# Patient Record
Sex: FEMALE | Race: WHITE | NOT HISPANIC OR LATINO | Employment: OTHER | URBAN - METROPOLITAN AREA
[De-identification: names, ages, dates, MRNs, and addresses within clinical notes are randomized per-mention and may not be internally consistent; named-entity substitution may affect disease eponyms.]

---

## 2017-05-04 ENCOUNTER — OFFICE VISIT (OUTPATIENT)
Dept: NEUROSURGERY | Facility: CLINIC | Age: 82
End: 2017-05-04

## 2017-05-04 VITALS — BODY MASS INDEX: 24.54 KG/M2 | HEIGHT: 60 IN | WEIGHT: 125 LBS

## 2017-05-04 DIAGNOSIS — M43.16 SPONDYLOLISTHESIS OF LUMBAR REGION: ICD-10-CM

## 2017-05-04 DIAGNOSIS — M48.061 LUMBAR STENOSIS: Primary | ICD-10-CM

## 2017-05-04 PROCEDURE — 99213 OFFICE O/P EST LOW 20 MIN: CPT | Performed by: NEUROLOGICAL SURGERY

## 2017-05-04 RX ORDER — LOSARTAN POTASSIUM AND HYDROCHLOROTHIAZIDE 25; 100 MG/1; MG/1
TABLET ORAL
COMMUNITY
Start: 2017-05-01

## 2017-05-04 RX ORDER — ESOMEPRAZOLE MAGNESIUM 40 MG/1
40 CAPSULE, DELAYED RELEASE ORAL
COMMUNITY

## 2017-05-04 RX ORDER — MULTIVIT WITH MINERALS/LUTEIN
1000 TABLET ORAL DAILY
COMMUNITY

## 2017-05-04 RX ORDER — DILTIAZEM HYDROCHLORIDE 360 MG/1
360 CAPSULE, EXTENDED RELEASE ORAL DAILY
COMMUNITY

## 2017-05-04 RX ORDER — ROSUVASTATIN CALCIUM 20 MG/1
20 TABLET, COATED ORAL DAILY
COMMUNITY
End: 2021-07-02

## 2017-05-04 RX ORDER — ASPIRIN 81 MG/1
81 TABLET ORAL DAILY
COMMUNITY

## 2017-05-04 RX ORDER — POTASSIUM CHLORIDE 20 MEQ/1
TABLET, EXTENDED RELEASE ORAL
COMMUNITY
Start: 2017-04-10

## 2017-05-04 RX ORDER — LEVOTHYROXINE SODIUM 112 UG/1
112 TABLET ORAL DAILY
COMMUNITY

## 2017-05-04 RX ORDER — ESTRADIOL 0.05 MG/D
1 PATCH TRANSDERMAL WEEKLY
COMMUNITY

## 2017-06-12 ENCOUNTER — TRANSCRIBE ORDERS (OUTPATIENT)
Dept: ADMINISTRATIVE | Facility: HOSPITAL | Age: 82
End: 2017-06-12

## 2017-06-12 ENCOUNTER — HOSPITAL ENCOUNTER (OUTPATIENT)
Dept: MRI IMAGING | Facility: HOSPITAL | Age: 82
Discharge: HOME | End: 2017-06-12
Admitting: FAMILY MEDICINE

## 2017-06-12 DIAGNOSIS — R51.9 NONINTRACTABLE HEADACHE, UNSPECIFIED CHRONICITY PATTERN, UNSPECIFIED HEADACHE TYPE: Primary | ICD-10-CM

## 2017-06-12 DIAGNOSIS — R51.9 NONINTRACTABLE HEADACHE, UNSPECIFIED CHRONICITY PATTERN, UNSPECIFIED HEADACHE TYPE: ICD-10-CM

## 2017-06-12 PROCEDURE — 70551 MRI BRAIN STEM W/O DYE: CPT

## 2021-05-28 ENCOUNTER — OFFICE VISIT (OUTPATIENT)
Dept: NEUROSURGERY | Facility: CLINIC | Age: 86
End: 2021-05-28

## 2021-05-28 VITALS — HEIGHT: 60 IN | WEIGHT: 139.4 LBS | BODY MASS INDEX: 27.37 KG/M2 | TEMPERATURE: 96.9 F

## 2021-05-28 DIAGNOSIS — M43.16 SPONDYLOLISTHESIS OF LUMBAR REGION: ICD-10-CM

## 2021-05-28 DIAGNOSIS — M48.062 SPINAL STENOSIS OF LUMBAR REGION WITH NEUROGENIC CLAUDICATION: Primary | ICD-10-CM

## 2021-05-28 PROCEDURE — 99203 OFFICE O/P NEW LOW 30 MIN: CPT | Performed by: NEUROLOGICAL SURGERY

## 2021-05-28 RX ORDER — LOSARTAN POTASSIUM 100 MG/1
TABLET ORAL
COMMUNITY

## 2021-05-28 RX ORDER — PSEUDOEPHEDRINE HCL 30 MG
TABLET ORAL
COMMUNITY

## 2021-05-28 RX ORDER — TRIAMCINOLONE ACETONIDE 5 MG/G
CREAM TOPICAL
COMMUNITY

## 2021-05-28 RX ORDER — ESOMEPRAZOLE MAGNESIUM 40 MG/1
CAPSULE, DELAYED RELEASE ORAL
COMMUNITY

## 2021-05-28 RX ORDER — ESTRADIOL 0.5 MG/1
0.5 TABLET ORAL DAILY
COMMUNITY
Start: 2021-03-01

## 2021-05-28 RX ORDER — DOXYCYCLINE HYCLATE 100 MG/1
CAPSULE ORAL
COMMUNITY

## 2021-05-28 RX ORDER — ASPIRIN 81 MG/1
TABLET ORAL
COMMUNITY

## 2021-05-28 RX ORDER — FLUTICASONE PROPIONATE 50 MCG
1 SPRAY, SUSPENSION (ML) NASAL DAILY
COMMUNITY
Start: 2021-03-16

## 2021-05-28 RX ORDER — HYDROCHLOROTHIAZIDE 12.5 MG/1
TABLET ORAL
COMMUNITY

## 2021-05-28 RX ORDER — HYDRALAZINE HYDROCHLORIDE 10 MG/1
TABLET, FILM COATED ORAL
COMMUNITY

## 2021-05-28 RX ORDER — LEVOTHYROXINE SODIUM 0.1 MG/1
TABLET ORAL
COMMUNITY

## 2021-05-28 RX ORDER — LEVOTHYROXINE SODIUM 112 UG/1
TABLET ORAL
COMMUNITY

## 2021-05-28 RX ORDER — HYDRALAZINE HYDROCHLORIDE 10 MG/1
TABLET, FILM COATED ORAL
COMMUNITY
Start: 2021-03-16

## 2021-05-28 RX ORDER — NITROGLYCERIN 0.4 MG/1
TABLET SUBLINGUAL
COMMUNITY

## 2021-05-28 RX ORDER — LOSARTAN POTASSIUM AND HYDROCHLOROTHIAZIDE 12.5; 1 MG/1; MG/1
TABLET ORAL
COMMUNITY

## 2021-05-28 NOTE — PROGRESS NOTES
Patient: Venita San  : 1935    Primary Care Provider: Dedra Ariza MD    Requesting Provider: As above        History    Chief Complaint: Low back and right thigh pain with lower extremity sensory alteration.    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  She is known to our service.  In 2017 she saw my former colleague Dr. Brennan for some similar problems.  She was noted to have stenosis and spondylolisthesis.  Her symptoms were manageable.  Over the last 6 months or so her symptoms have increased.  She has some occasional low back pain.  Most of the pain is in her hip and will extend into the front and back of her right thigh.  In the past she has had some left leg symptoms.  If she stands or walks too long then she gets numbness in her feet and legs.  This goes away with sitting or lying down.  She has had an IM shot of Decadron and some oral steroids in recent months and that was helpful.  Ibuprofen is taken intermittently and that is helpful.  She has no bowel or bladder dysfunction.    Review of Systems   Constitutional: Negative for activity change, appetite change, chills, diaphoresis, fatigue, fever and unexpected weight change.   HENT: Positive for congestion and hearing loss. Negative for dental problem, drooling, ear discharge, ear pain, facial swelling, mouth sores, nosebleeds, postnasal drip, rhinorrhea, sinus pressure, sinus pain, sneezing, sore throat, tinnitus, trouble swallowing and voice change.    Eyes: Negative for photophobia, pain, discharge, redness, itching and visual disturbance.   Respiratory: Negative for apnea, cough, choking, chest tightness, shortness of breath, wheezing and stridor.    Cardiovascular: Negative for chest pain, palpitations and leg swelling.   Gastrointestinal: Negative for abdominal distention, abdominal pain, anal bleeding, blood in stool, constipation, diarrhea, nausea, rectal pain  "and vomiting.   Endocrine: Negative for cold intolerance, heat intolerance, polydipsia, polyphagia and polyuria.   Genitourinary: Negative for decreased urine volume, difficulty urinating, dysuria, enuresis, flank pain, frequency, genital sores, hematuria and urgency.   Musculoskeletal: Positive for back pain. Negative for arthralgias, gait problem, joint swelling, myalgias, neck pain and neck stiffness.   Skin: Negative for color change, pallor, rash and wound.   Allergic/Immunologic: Positive for environmental allergies and food allergies. Negative for immunocompromised state.   Neurological: Positive for numbness. Negative for dizziness, tremors, seizures, syncope, facial asymmetry, speech difficulty, weakness, light-headedness and headaches.   Hematological: Negative for adenopathy. Bruises/bleeds easily.   Psychiatric/Behavioral: Negative for agitation, behavioral problems, confusion, decreased concentration, dysphoric mood, hallucinations, self-injury, sleep disturbance and suicidal ideas. The patient is not nervous/anxious and is not hyperactive.        The patient's past medical history, past surgical history, family history, and social history have been reviewed at length in the electronic medical record.    Physical Exam:   Temp 96.9 °F (36.1 °C)   Ht 152.4 cm (60\")   Wt 63.2 kg (139 lb 6.4 oz)   BMI 27.22 kg/m²   CONSTITUTIONAL: Patient is well-nourished, pleasant and appears younger than her stated age  CV: Heart regular rate and rhythm without murmur, rub, or gallop.  PULMONARY: Lungs are clear to ascultation.  MUSCULOSKELETAL:  Straight leg raising is negative.  Alexis's Sign is negative.  ROM in the low back is normal.  Tenderness in the back to palpation is not observed.  NEUROLOGICAL:  Orientation, memory, attention span, language function, and cognition have been examined and are intact.  Strength is intact in the lower extremities to direct testing.  Muscle tone is normal throughout.  Station " and gait are normal.  Sensation is intact to light touch testing throughout.  Deep tendon reflexes are difficult to elicit throughout.  Coordination is intact.      Medical Decision Making    Data Review:   (All imaging studies were personally reviewed unless stated otherwise)  MRI of the lumbar spine dated 5/7/2021 demonstrates multilevel degenerative disc disease and facet arthropathy.  This results in moderate to generous stenosis at L2-3 and severe stenosis at L3-4 and L4-5.  Low-grade listhesis of L3 on L4 and L4 on L5 is noted.  Modest joint effusions are noted bilaterally at L5-S1, on the left at L4-5, and bilaterally at L3-4.    Diagnosis:   1.  Lumbar stenosis with neurogenic claudication.  2.  Lumbar spondylolisthesis.    Treatment Options:   I am going to refer the patient for some therapy.  Prior to follow-up in several weeks I am going to check some flexion-extension upright lateral spine x-rays.  She does not feel that her symptoms are severe enough to warrant surgical intervention.  If the therapy is not helpful then we might consider injections.       Diagnosis Plan   1. Spinal stenosis of lumbar region with neurogenic claudication  Ambulatory Referral to Physical Therapy Evaluate and treat; Stretching, ROM, Strengthening   2. Spondylolisthesis of lumbar region  XR Spine Lumbar Complete With Flex & Ext       Scribed for James Tobias MD by TONJA Chavez 5/28/2021 10:43 EDT      I, Dr. Tobias, personally performed the services described in the documentation, as scribed in my presence, and it is both accurate and complete.

## 2021-06-30 ENCOUNTER — HOSPITAL ENCOUNTER (OUTPATIENT)
Dept: GENERAL RADIOLOGY | Facility: HOSPITAL | Age: 86
Discharge: HOME | End: 2021-06-30
Admitting: NEUROLOGICAL SURGERY

## 2021-06-30 DIAGNOSIS — M43.16 SPONDYLOLISTHESIS OF LUMBAR REGION: ICD-10-CM

## 2021-06-30 PROCEDURE — 72120 X-RAY BEND ONLY L-S SPINE: CPT

## 2021-07-02 ENCOUNTER — OFFICE VISIT (OUTPATIENT)
Dept: NEUROSURGERY | Facility: CLINIC | Age: 86
End: 2021-07-02

## 2021-07-02 VITALS — WEIGHT: 140 LBS | BODY MASS INDEX: 27.48 KG/M2 | TEMPERATURE: 97.5 F | HEIGHT: 60 IN

## 2021-07-02 DIAGNOSIS — M43.16 SPONDYLOLISTHESIS OF LUMBAR REGION: ICD-10-CM

## 2021-07-02 DIAGNOSIS — M48.062 SPINAL STENOSIS OF LUMBAR REGION WITH NEUROGENIC CLAUDICATION: Primary | ICD-10-CM

## 2021-07-02 DIAGNOSIS — R20.0 NUMBNESS AND TINGLING OF LOWER EXTREMITY: ICD-10-CM

## 2021-07-02 DIAGNOSIS — R20.2 NUMBNESS AND TINGLING OF LOWER EXTREMITY: ICD-10-CM

## 2021-07-02 PROBLEM — R10.11 RIGHT UPPER QUADRANT PAIN: Status: ACTIVE | Noted: 2021-07-02

## 2021-07-02 PROBLEM — M54.30 ACUTE SCIATICA: Status: ACTIVE | Noted: 2021-07-02

## 2021-07-02 PROBLEM — I10 BENIGN HYPERTENSION: Status: ACTIVE | Noted: 2021-07-02

## 2021-07-02 PROBLEM — R07.9 CHEST PAIN: Status: ACTIVE | Noted: 2021-07-02

## 2021-07-02 PROBLEM — E03.9 HYPOTHYROIDISM: Status: ACTIVE | Noted: 2021-07-02

## 2021-07-02 PROBLEM — M48.9 CERVICAL SPINE DISEASE: Status: ACTIVE | Noted: 2021-07-02

## 2021-07-02 PROBLEM — K21.9 GASTROESOPHAGEAL REFLUX DISEASE: Status: ACTIVE | Noted: 2020-08-24

## 2021-07-02 PROBLEM — R53.83 FATIGUE: Status: ACTIVE | Noted: 2021-07-02

## 2021-07-02 PROBLEM — N81.6 HERNIATION OF RECTUM INTO VAGINA: Status: ACTIVE | Noted: 2021-07-02

## 2021-07-02 PROBLEM — M76.30 ILIOTIBIAL BAND SYNDROME: Status: ACTIVE | Noted: 2021-07-02

## 2021-07-02 PROBLEM — K30 INDIGESTION: Status: ACTIVE | Noted: 2021-07-02

## 2021-07-02 PROBLEM — M25.569 KNEE PAIN: Status: ACTIVE | Noted: 2021-07-02

## 2021-07-02 PROCEDURE — 99212 OFFICE O/P EST SF 10 MIN: CPT | Performed by: NEUROLOGICAL SURGERY

## 2021-07-02 RX ORDER — ROSUVASTATIN CALCIUM 40 MG/1
40 TABLET, COATED ORAL DAILY
COMMUNITY
Start: 2021-06-09

## 2021-07-02 NOTE — PROGRESS NOTES
Patient: Venita San  : 1935    Primary Care Provider: Dedra Ariza MD    Requesting Provider: As above        History    Chief Complaint: Low back and right thigh pain with lower extremity sensory alteration.    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  She saw Dr. Brennan in 2017 with similar problems.  She has a known spondylolisthesis and stenosis.  Over the last 6 months her symptoms have increased.  Intermittently she will have bothersome low back pain.  Sometimes the pain will involve her hip as well as her thigh.  She has done some therapy since I saw her last.  On some days it is helpful on other days it makes her worse.  It has helped with strengthening.  Her symptoms are unpredictable.  Some days they bother her and other days they do not.  Her biggest frustration is not pain but the fact that her legs will become numb and give out on her intermittently.  This does not occur with any sort of position or activity.    Review of Systems   Constitutional: Negative for activity change, appetite change, chills, diaphoresis, fatigue, fever and unexpected weight change.   HENT: Positive for congestion and hearing loss. Negative for dental problem, drooling, ear discharge, ear pain, facial swelling, mouth sores, nosebleeds, postnasal drip, rhinorrhea, sinus pressure, sinus pain, sneezing, sore throat, tinnitus, trouble swallowing and voice change.    Eyes: Negative for photophobia, pain, discharge, redness, itching and visual disturbance.   Respiratory: Negative for apnea, cough, choking, chest tightness, shortness of breath, wheezing and stridor.    Cardiovascular: Negative for chest pain, palpitations and leg swelling.   Gastrointestinal: Negative for abdominal distention, abdominal pain, anal bleeding, blood in stool, constipation, diarrhea, nausea, rectal pain and vomiting.   Endocrine: Negative for cold intolerance, heat  "intolerance, polydipsia, polyphagia and polyuria.   Genitourinary: Negative for decreased urine volume, difficulty urinating, dysuria, enuresis, flank pain, frequency, genital sores, hematuria and urgency.   Musculoskeletal: Positive for back pain. Negative for arthralgias, gait problem, joint swelling, myalgias, neck pain and neck stiffness.   Skin: Negative for color change, pallor, rash and wound.   Allergic/Immunologic: Positive for environmental allergies and food allergies. Negative for immunocompromised state.   Neurological: Positive for numbness. Negative for dizziness, tremors, seizures, syncope, facial asymmetry, speech difficulty, weakness, light-headedness and headaches.   Hematological: Negative for adenopathy. Bruises/bleeds easily.   Psychiatric/Behavioral: Negative for agitation, behavioral problems, confusion, decreased concentration, dysphoric mood, hallucinations, self-injury, sleep disturbance and suicidal ideas. The patient is not nervous/anxious and is not hyperactive.        The patient's past medical history, past surgical history, family history, and social history have been reviewed at length in the electronic medical record.    Physical Exam:   Temp 97.5 °F (36.4 °C)   Ht 152.4 cm (60\")   Wt 63.5 kg (140 lb)   BMI 27.34 kg/m²   MUSCULOSKELETAL:  Straight leg raising is negative.  Alexis's Sign is negative.  ROM in the low back is normal.  Tenderness in the back to palpation is not observed.  NEUROLOGICAL:  Strength is intact in the lower extremities to direct testing.  Muscle tone is normal throughout.  Station and gait are normal.  Sensation is intact to light touch testing throughout.      Medical Decision Making    Data Review:   (All imaging studies were personally reviewed unless stated otherwise)  MRI of the lumbar spine dated 5/7/2021 demonstrates multilevel degenerative disc disease and facet arthropathy.  This results in moderate to generous stenosis at L2-3 and severe " stenosis at L3-4 and L4-5.  Low-grade listhesis of L3 on L4 and L4 on L5 is noted.  Modest joint effusions are noted bilaterally at L5-S1, on the left at L4-5, and bilaterally at L3-4.    Plain flexion-extension upright lateral lumbar spine x-rays demonstrate diminished bone density.  Prominent listhesis of L3 on L4 and even more so at L4-5 is stable however.    Diagnosis:   1.  Intermittent mechanical back pain and claudication.  2.  Lumbar spondylolisthesis.    Treatment Options:   The patient symptoms really come and go.  Symptoms are not severe enough to warrant surgical intervention.  Unfortunately I do not have a simple solution to address her intermittent symptoms.  If her difficulties progress then I will be happy to see her in follow-up.       Diagnosis Plan   1. Spinal stenosis of lumbar region with neurogenic claudication     2. Spondylolisthesis of lumbar region     3. Numbness and tingling of lower extremity           Scribed for James Tobias MD by Juana Palmer CMA. 7/2/2021 10:04 EDT  I, Dr. Tobias, personally performed the services described in the documentation, as scribed in my presence, and it is both accurate and complete.

## 2023-06-06 ENCOUNTER — OFFICE VISIT (OUTPATIENT)
Dept: NEUROSURGERY | Facility: CLINIC | Age: 88
End: 2023-06-06
Payer: MEDICARE

## 2023-06-06 VITALS — HEIGHT: 60 IN | BODY MASS INDEX: 26.62 KG/M2 | WEIGHT: 135.6 LBS | TEMPERATURE: 98 F

## 2023-06-06 DIAGNOSIS — M51.36 DDD (DEGENERATIVE DISC DISEASE), LUMBAR: ICD-10-CM

## 2023-06-06 DIAGNOSIS — M43.16 SPONDYLOLISTHESIS OF LUMBAR REGION: ICD-10-CM

## 2023-06-06 DIAGNOSIS — M48.062 SPINAL STENOSIS OF LUMBAR REGION WITH NEUROGENIC CLAUDICATION: Primary | ICD-10-CM

## 2023-06-06 PROCEDURE — 1159F MED LIST DOCD IN RCRD: CPT | Performed by: NEUROLOGICAL SURGERY

## 2023-06-06 PROCEDURE — 1160F RVW MEDS BY RX/DR IN RCRD: CPT | Performed by: NEUROLOGICAL SURGERY

## 2023-06-06 PROCEDURE — 99214 OFFICE O/P EST MOD 30 MIN: CPT | Performed by: NEUROLOGICAL SURGERY

## 2023-06-06 RX ORDER — ACETAMINOPHEN/DIPHENHYDRAMINE 500MG-25MG
1 TABLET ORAL NIGHTLY
COMMUNITY

## 2023-06-06 RX ORDER — FLUTICASONE PROPIONATE 50 MCG
1 SPRAY, SUSPENSION (ML) NASAL DAILY
COMMUNITY

## 2023-06-06 RX ORDER — GABAPENTIN 300 MG/1
300 CAPSULE ORAL NIGHTLY
COMMUNITY

## 2023-06-06 RX ORDER — LORATADINE 10 MG/1
10 TABLET ORAL DAILY
COMMUNITY

## 2023-06-06 RX ORDER — ROSUVASTATIN CALCIUM 20 MG/1
20 TABLET, COATED ORAL DAILY
COMMUNITY

## 2023-06-06 NOTE — PROGRESS NOTES
Patient: Venita San  : 1935    Primary Care Provider: Dedra Ariza MD    Requesting Provider: As above        History    Chief Complaint: Low back and lower extremity pain with sensory alteration involving the legs.    History of Present Illness: Ms. 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 She saw Dr. Brennan in  with similar problems. She has a known spondylolisthesis and stenosis.  I saw her in 2021 with these complaints.  Her symptoms have progressed and become less tolerable.  Intermittently she gets pain extending from her back and hip into the knee and when severe into her right anterior shin where she has some numbness.  Somewhat unpredictably she will get numbness in her legs and feet.  While that seems to occur unpredictably it abates when she sits down.  She has had 2 epidural injections with one being performed in February and another at the end of March.  The first one helped for about 5 weeks and the second one not so much so.  She has done physical therapy.  She is riding a stationary bike at home.  Her lifestyle is somewhat limited right now she wants to travel and do other such things.  Gabapentin has not helped in the past.    Review of Systems   Constitutional:  Negative for activity change, appetite change, chills, diaphoresis, fatigue, fever and unexpected weight change.   HENT:  Positive for hearing loss and postnasal drip. Negative for congestion, dental problem, drooling, ear discharge, ear pain, facial swelling, mouth sores, nosebleeds, rhinorrhea, sinus pressure, sinus pain, sneezing, sore throat, tinnitus, trouble swallowing and voice change.    Eyes:  Negative for photophobia, pain, discharge, redness, itching and visual disturbance.   Respiratory:  Negative for apnea, cough, choking, chest tightness, shortness of breath, wheezing and stridor.    Cardiovascular:  Negative for chest pain, palpitations and leg swelling.  "  Gastrointestinal:  Negative for abdominal distention, abdominal pain, anal bleeding, blood in stool, constipation, diarrhea, nausea, rectal pain and vomiting.   Endocrine: Negative for cold intolerance, heat intolerance, polydipsia, polyphagia and polyuria.   Genitourinary:  Negative for decreased urine volume, difficulty urinating, dyspareunia, dysuria, enuresis, flank pain, frequency, genital sores, hematuria, menstrual problem, pelvic pain, urgency, vaginal bleeding, vaginal discharge and vaginal pain.   Musculoskeletal:  Positive for back pain. Negative for arthralgias, gait problem, joint swelling, myalgias, neck pain and neck stiffness.   Skin:  Negative for color change, pallor, rash and wound.   Allergic/Immunologic: Positive for environmental allergies and food allergies. Negative for immunocompromised state.   Neurological:  Negative for dizziness, tremors, seizures, syncope, facial asymmetry, speech difficulty, weakness, light-headedness, numbness and headaches.   Hematological:  Negative for adenopathy. Does not bruise/bleed easily.   Psychiatric/Behavioral:  Negative for agitation, behavioral problems, confusion, decreased concentration, dysphoric mood, hallucinations, self-injury, sleep disturbance and suicidal ideas. The patient is not nervous/anxious and is not hyperactive.      The patient's past medical history, past surgical history, family history, and social history have been reviewed at length in the electronic medical record.      Physical Exam:   Temp 98 °F (36.7 °C) (Infrared)   Ht 152.4 cm (60\")   Wt 61.5 kg (135 lb 9.6 oz)   BMI 26.48 kg/m²   MUSCULOSKELETAL:  Straight leg raising is negative.  Alexis's Sign is negative.  ROM in the low back is normal.  Tenderness in the back to palpation is not observed.  NEUROLOGICAL:  Strength is intact in the lower extremities to direct testing.  Muscle tone is normal throughout.  Station and gait are normal.  Sensation is intact to light touch " testing throughout.  Deep tendon reflexes are difficult to elicit throughout.  Coordination is intact.      Medical Decision Making    Data Review:   (All imaging studies were personally reviewed unless stated otherwise)  MRI of the lumbar spine dated 4/11/2023 demonstrates diffuse degenerative disc disease and some facet arthropathy.  There is moderate to generous stenosis at L2-3.  There is high-grade stenosis at L3-4 where there is a prominent grade 1 listhesis.  Similar findings are noted at L4-5.  Bilateral joint effusions are noted at L5-S1 and at L2-3.  These are fairly modest.    Previous flexion-extension films demonstrate stability of the listhesis at L3-4 and L4-5.  Her bone density also appears to be somewhat diminished.    Diagnosis:   1.  Mechanical low back pain.  2.  Lumbar radiculopathy.  3.  Lumbar stenosis with some degree of neurogenic claudication.  4.  Lumbar spondylolisthesis without overt instability.    Treatment Options:   If the patient is struggling then I would consider decompressive laminectomies at L2-3, L3-4, and L4-5.  The goal would be to improve her claudication symptoms and help with her radicular symptoms.  In addition to the potential risks and complications that I have outlined there is a risk of her developing instability given the documented spondylolisthesis.  She is going to consider her options.  If her pain is severe and her lifestyle is curtailed significantly then I would proceed with surgical intervention.  If she is getting by then I will see her if her symptoms worsen.       Diagnosis Plan   1. Spinal stenosis of lumbar region with neurogenic claudication        2. Spondylolisthesis of lumbar region        3. DDD (degenerative disc disease), lumbar            Scribed for James Tobias MD by Kandi Roberts ZOE 6/6/2023 16:21 EDT      I, Dr. Tobias, personally performed the services described in the documentation, as scribed in my presence, and it is both accurate  and complete.